# Patient Record
Sex: FEMALE | Race: WHITE | NOT HISPANIC OR LATINO | URBAN - METROPOLITAN AREA
[De-identification: names, ages, dates, MRNs, and addresses within clinical notes are randomized per-mention and may not be internally consistent; named-entity substitution may affect disease eponyms.]

---

## 2019-07-05 ENCOUNTER — HISTORICAL (OUTPATIENT)
Dept: ADMINISTRATIVE | Facility: HOSPITAL | Age: 84
End: 2019-07-05

## 2019-07-26 ENCOUNTER — HISTORICAL (OUTPATIENT)
Dept: LAB | Facility: HOSPITAL | Age: 84
End: 2019-07-26

## 2019-07-29 ENCOUNTER — HISTORICAL (OUTPATIENT)
Dept: LAB | Facility: HOSPITAL | Age: 84
End: 2019-07-29

## 2019-07-31 ENCOUNTER — HISTORICAL (OUTPATIENT)
Dept: ADMINISTRATIVE | Facility: HOSPITAL | Age: 84
End: 2019-07-31

## 2019-07-31 LAB — FINAL CULTURE: NORMAL

## 2022-04-29 VITALS
HEIGHT: 67 IN | SYSTOLIC BLOOD PRESSURE: 108 MMHG | DIASTOLIC BLOOD PRESSURE: 66 MMHG | WEIGHT: 166.25 LBS | BODY MASS INDEX: 26.09 KG/M2

## 2022-04-29 NOTE — DISCHARGE SUMMARY
Patient:   Sherwin Jenkins            MRN: [id number]            FIN: [id number]               Age:   86 years     Sex:  Female     :  1932   Associated Diagnoses:   None   Author:   Roshni Wooten      Date of Service: 2019      Discharge Information      Discharge Summary Information   Date of Admission: 2019  Date of Discharge: 2019  Admit Diagnosis:  Deconditioning/debility secondary to left hip fracture         Leukocytosis         Dementia         Anemia         GERD  Discharge Diagnosis:  Deconditioning/debility secondary to left hip fracture (improved)          Dementia (stable)          Anemia (stable)          GERD (stable)          PCM/anorexia (stable)          Dysuria (improved)  Internal Medicine (attending):  Avila Irizarry MD  Physiatry (consulting):  Ilir De La Fuente MD     OUTPATIENT PROVIDERS  PCP:  Hood Cornell MD (in [place], will need referral to PCP in [place])  Orthopedics:  Chandler Vazquez MD       Hospital Course   86-year-old WF presented to [facility] ED on 2019 complaining of left hip pain after falling in bathroom at daughter's home.  Of note, at the time of the fall she was being treated for UTI with Macrobid.  PMH significant for dementia/mild cognitive impairment.  Work-up significant for left displaced comminuted proximal femoral fracture.  Tolerated left intertrochanteric IM nailing without perioperative complications.  Hospital course uneventful.  Mild anemia postoperatively (H&H from 10.2-8.4 and 33.6-26.8).  Recommendations from orthopedics to utilize Lovenox 30 mg twice a day for 2 weeks followed by aspirin twice a day for 4 weeks.  Tolerated transfer to [facility]  inpatient rehab unit on  without incident.    During inpatient rehab course, patient remained continent of bowel and bladder.  Has nocturnal incontinence intermittently.  Therapy scores as follows: RT: Overall minimal to standby assist.  Limitations include pain control and cognitive  impairment.  Requires rest breaks.  PT: Overall standby assist.  Bed mobility improved.  Gait distance limited by fatigue at times.  Needs both railings for stairs.  OT: Overall minimal assist.  Anxious at times.  Appetite and bowel regimen remains at goal.  Medication reconciliation completed.  Vital signs are stable.  Recent lab work unremarkable.  Stable for discharge home with home health.  To follow-up with scheduled appointments reported below.    Chief Complaint:  Debility 2/2 left displaced comminuted proximal femoral fracture s/p left intertrochanteric IM nailing on 6/28/2019        Physical Examination      Vital Signs (last 24 hrs)_____  Last Charted___________  Temp Oral      36.6 DegC  (JUL 22 06:08)  Heart Rate Peripheral   78 bpm  (JUL 22 06:08)  Resp Rate          20 br/min  (JUL 21 14:44)  SBP      109 mmHg  (JUL 22 06:08)  DBP      70 mmHg  (JUL 22 06:08)  SpO2      98 %  (JUL 22 06:08)     General:  Alert and oriented, No acute distress.    Eye:  Pupils are equal, round and reactive to light, Vision unchanged.    HENT:  Normocephalic.    Neck:  Supple, Non-tender.    Respiratory:  Lungs are clear to auscultation, Respirations are non-labored, Breath sounds are equal, Symmetrical chest wall expansion, No chest wall tenderness.    Cardiovascular:  Normal rate, Regular rhythm, No murmur, Good pulses equal in all extremities, Normal peripheral perfusion, No edema.    Gastrointestinal:  Soft, Non-tender, Normal bowel sounds.    Musculoskeletal:  Normal range of motion, No tenderness, No swelling, No deformity, clean/dry dressing to left hip.    Integumentary:  Warm, Intact, Moist, No pallor, No rash.    Neurologic:  Alert, Oriented, Normal sensory, Normal motor function, No focal deficits, Cranial Nerves II-XII are grossly intact.    Cognition and Speech:  Speech clear and coherent, Oriented to self.    Psychiatric:  Cooperative, Appropriate mood & affect, Non-suicidal.        Results Review   General  results   Today's results   7/22/2019 5:50 CDT       WBC                       5.2 x10(3)/mcL                             RBC                       3.67 x10(6)/mcL  LOW                             Hgb                       10.8 gm/dL  LOW                             Hct                       34.7 %  LOW                             Platelet                  308 x10(3)/mcL                             MCV                       94.6 fL  HI                             MCH                       29.4 pg                             MCHC                      31.1 gm/dL  LOW                             RDW                       14.5 %                             MPV                       9.2 fL                             Abs Neut                  2.02 x10(3)/mcL  LOW                             Neutro Auto               39.0 %  LOW                             Lymph Auto                48.3 %  HI                             Mono Auto                 10.5 %                             Eos Auto                  1.4 %                             Abs Eos                   0.07 x10(3)/mcL                             Basophil Auto             0.6 %                             Abs Neutro                2.02 x10(3)/mcL  LOW                             Abs Lymph                 2.49 x10(3)/mcL                             Abs Mono                  0.54 x10(3)/mcL                             Abs Baso                  0.03 x10(3)/mcL                             NRBC%                     0.0 %                             IG%                       0.200 %                             IG#                       0.0100                             Sodium Lvl                142 mmol/L                             Potassium Lvl             4.2 mmol/L                             Chloride                  110 mmol/L  HI                             CO2                       26.0 mmol/L                             Calcium Lvl               8.8 mg/dL                              Glucose Lvl               88 mg/dL                             BUN                       18.0 mg/dL                             Creatinine                0.89 mg/dL                             eGFR-AA                   >60 mL/min/1.73 m2  NA                             eGFR-FAISAL                  >60 mL/min/1.73 m2  NA                             Bili Total                0.4 mg/dL                             Bili Direct               0.10 mg/dL                             Bili Indirect             0.30 mg/dL                             AST                       15 unit/L                             ALT                       20 unit/L                             Alk Phos                  125 unit/L  HI                             Total Protein             6.5 gm/dL                             Albumin Lvl               2.9 gm/dL  LOW                             Globulin                  4 gm/dL                             A/G Ratio                 0.7 ratio  LOW                             Prealbumin                14.3 mg/dL  LOW     Most recent results      Discharge Plan   Discharge Summary Plan   Discharge Status: improved.        Location: Discharge to home home health     Medications: See discharge medicine reconciliation     Activity:  As tolerated     Diet:  Regular     Instructions:  Take all medications as prescribed.          Attend appointments as scheduled.          Return to ED if symptoms worsen, or if t > 100.4.     Education:  Left hip fracture.  Anorexia.  Anemia.       Follow-up: Chandler Vazquez MD on 7/31/2019 at 10 AM         [facility] care within 3 to 5 days      Discussed plan of care, and patient communicated understanding. Agreed to comply with recommendations.    Discharge Time: 46 minutes

## 2022-04-29 NOTE — H&P
Patient:   Sherwin Jenkins            MRN: [id number]            FIN: [id number]               Age:   86 years     Sex:  Female     :  1932   Associated Diagnoses:   None   Author:   Roshni Wooten      Date of Service: 2019       Chief Complaint   Debility 2/2 left displaced comminuted proximal femoral fracture s/p left intertrochanteric IM nailing on 2019       History of Present Illness   86-year-old WF presented to [facility] ED on 2019 complaining of left hip pain after falling in bathroom at daughter's home.  Of note, at the time of the fall she was being treated for UTI with Macrobid.  PMH significant for dementia/mild cognitive impairment.  Work-up significant for left displaced comminuted proximal femoral fracture.  Tolerated left intertrochanteric IM nailing without perioperative complications.  Hospital course uneventful.  Mild anemia postoperatively (H&H from 10.2-8.4 and 33.6-26.8).  Recommendations from orthopedics to utilize Lovenox 30 mg twice a day for 2 weeks followed by aspirin twice a day for 4 weeks.  Tolerated transfer to [facility]  inpatient rehab unit on  without incident.    Today sitting upright in chair in NAD.  Reports good appetite and sleep pattern.  LBM 6/30 after administration of miralax, colace and dulcolax per staff nurse.  Normally takes MOM nightly per daughter.  Vital signs at goal.  Recent labs and imaging reported below.        Histories   Past Medical History: Mild cognitive impairment/dementia, Left displaced comminuted proximal femoral fracture, Anemia   Procedure history: Left intertrochanteric IM nailing, Right TKA   Family History: Mother -  - No significant history, Father -  - No significant medical history   Social History     (-) TOB.     (+) ETOH occasional use.     (-) illicit drug use.     Completed some college.  Retired but previously worked in human resources.  Denies history of  involvement.  .  If she  returns to home in [facility] her main support will be neighbor.  He is a 92-year-old man who can check on her and may be able to provide transportation..     Prior level of functioning: Independent ADLs, drives, cooks, does chores, does laundry, grocery shops, manages finances, manages own meds, hires someone prolonged maintenance; does not have medical alert.  Enjoys playing golf several times a week.  Residence: Lived alone in [place]  [place] [facility].  Home is a single-story home with 1 step to gain entry.  No railings present.  Walk in shower with a built-in shower seat.  Threshold to gain entry, one grab bar installed and a hand-held shower.  Does not have an elevated toilet.  Currently living with her daughter in [place].   DME: One grab bar and shower, hand-held shower and rolling walker.  Patient will use [facility] for all future DME needs.      Review of Systems   Complete 12-point review of systems negative except for HPI      Health Status   Allergies:    Allergic Reactions (Selected)  No Known Allergies   Current medications:  (Selected)   Documented Medications  Documented  Percocet 5/325: 1 tab(s), Oral, q4hr, PRN PRN for pain, # 60 tab(s), 0 Refill(s)      Physical Examination      Vital Signs (last 24 hrs)_____  Last Charted___________  Temp Oral      36.4 DegC  (JUL 02 14:00)  Heart Rate Peripheral   90 bpm  (JUL 02 14:00)  Resp Rate          18 br/min  (JUL 02 14:00)  SBP      131 mmHg  (JUL 02 14:00)  DBP      68 mmHg  (JUL 02 14:00)  SpO2      98 %  (JUL 02 14:00)  Weight      74.8 kg  (JUL 02 14:00)     General:  Alert and oriented, No acute distress.    Eye:  Extraocular movements are intact, Normal conjunctiva.    HENT:  Normocephalic, Oral mucosa is moist.    Neck:  Supple, Non-tender, No jugular venous distention.    Respiratory:  Lungs are clear to auscultation, Respirations are non-labored, Breath sounds are equal.    Cardiovascular:  Normal rate,  Regular rhythm, No murmur.    Gastrointestinal:  Soft, Non-tender, Non-distended, Normal bowel sounds.    Musculoskeletal:  No swelling, No deformity, Clean/dry dressing to left hip.  No breakthrough bleeding/drainage. .    Integumentary:  Warm, Dry.    Neurologic:  Alert, No focal deficits, mild confusion.  , 2+ pedal pulses bilat, sensation intact to distal BLE.    Cognition and Speech:  Speech clear and coherent.    Psychiatric:  Cooperative, Appropriate mood & affect, Non-suicidal.       Review / Management   Laboratory Results   Today's Lab Results : PowerNote Discrete Results   7/2/2019 4:27 CDT        WBC                       13.0 x10(3)/mcL  HI                             RBC                       2.87 x10(6)/mcL  LOW                             Hgb                       8.6 gm/dL  LOW                             Hct                       26.6 %  LOW                             Platelet                  276 x10(3)/mcL                             MCV                       92.7 fL                             MCH                       30.0 pg                             MCHC                      32.3 gm/dL  LOW                             RDW                       13.4 %                             MPV                       10.2 fL                             Abs Neut                  7.61 x10(3)/mcL                             Neutro Auto               59 %  NA                             Lymph Auto                28 %  NA                             Mono Auto                 9 %  NA                             Eos Auto                  1 %  NA                             Abs Eos                   0.1 x10(3)/mcL                             Basophil Auto             0 %  NA                             Abs Neutro                7.61 x10(3)/mcL                             Abs Lymph                 3.7 x10(3)/mcL                             Abs Mono                  1.2 x10(3)/mcL                             Abs  Baso                  0.0 x10(3)/mcL                             Sodium Lvl                141 mmol/L                             Potassium Lvl             4.8 mmol/L                             Chloride                  105 mmol/L                             CO2                       33.0 mmol/L  HI                             Calcium Lvl               8.1 mg/dL  LOW                             Magnesium Lvl             2.3 mg/dL                             Glucose Lvl               105 mg/dL                             BUN                       11.0 mg/dL                             Creatinine                0.68 mg/dL                             eGFR-AA                   >60 mL/min/1.73 m2  NA                             eGFR-FAISAL                  >60 mL/min/1.73 m2  NA                             Bili Total                0.4 mg/dL                             Bili Direct               0.10 mg/dL                             Bili Indirect             0.30 mg/dL                             AST                       43 unit/L  HI                             ALT                       21 unit/L                             Alk Phos                  74 unit/L                             Total Protein             5.1 gm/dL  LOW                             Albumin Lvl               2.50 gm/dL  LOW                             Globulin                  2.60 gm/dL                             A/G Ratio                 1.0 ratio  LOW        Chest x-ray results   Anterior/posterior, Lateral, Reported at  6/28/2019 07:52:00, Reviewed radiologist's report, Reveals no acute disease process   Radiology results   X-ray, Left hip , Reported at  6/28/2019 07:52:00, Reviewed radiologist's report, Interpretation:  Displaced and comminuted proximal femoral fracture with lesser  trochanteric involvement      Impression and Plan   85 yo WF admitted on 7/2/2019    Deconditioning/debility secondary to left hip fracture  - s/p left  intertrochanteric IM nailing on 6/28/2019  - consult physiatry for rehab and pain management  - PT/OT/RT/ST to evaluate and treat  - TTWB to LLE  - Continue    Percocet 5/325mg q4 hours as needed  Ascorbic acid 500mg at bedtime    Leukocytosis  -most likely reactive  - will continue to follow closely    Dementia  - stable  - continue   Donepezil 10 mg at night    Anemia  - asymptomatic/stable   - will closely monitor    GERD  - stable  - continue   Protonix 40mg daily    AB therapy:  Cefazolin (6/28-7/1)    VTE Prophylaxis: Lovenox 30mg SQ for 2 weeks (7/12), then ASA for 4 weeks (8/3)    POA: Em Ann (daughter)  Living will: No  Contacts:  Em Ann (daughter) [phone]      Susi Ann (daughter) [phone]    CODE STATUS: FULL CODE  Internal Medicine (attending): Avila Irizarry MD  Physiatry (consulting): Ilir De La Fuente MD    OUTPATIENT PROVIDERS  PCP:  Hood Cornell MD (in [place], will need referral to PCP in [place])  Orthopedics:  Chandler Vazquez MD    DISPOSITION: Condition stable.  Tolerated transfer.  Recent labs and imaging reviewed.  Rehab admission orders initiated.  Medication reconciliation completed.  Consult physiatry for rehab and pain management.  PT/OT/RT/ST to evaluate and treat.  Admission labs in the morning.  Vital signs at goal.  MD of acute changes.    Total time spent on this encounter including chart review and direct 1-on-1 patient interaction: 106 minutes   Over 50% of this time was spent in counseling and coordination of care

## 2022-05-02 NOTE — HISTORICAL [facility] CERNER
This is a historical note converted from Lelo. Formatting and pictures may have been removed.  Please reference Lelo for original formatting and attached multimedia. Chief Complaint  5 WEEK F/U IM NAILING LEFT FEMUR SX 6/28/19 GLOBAL 9/26/19 HERE TODAY FOR X-RAYS.  History of Present Illness  Here today for follow-up evaluation status post?intramedullary nailing of left intertrochanteric femur fracture. She has been staying with her daughter.?Orders were for her to be toe-touch weightbearing to left lower extremity however her daughter states that shes been going up and downstairs?at her apartment?with assistance.?Shes been walking on her toes?which appears to be a miscommunication between what?toe-touch weightbearing means versus?what she has been?shown to do with physical therapy. She reports no pain in her left hip. She does have pain and stiffness in her left knee which was pre-existing her fall. She has?left?knee osteoarthritis?and new that she?probably needed a total knee replacement on the left?prior to falling and breaking her hip.  Review of Systems  Otherwise negative  Physical Exam  Vitals & Measurements  T:?97.8? ?F (Oral)? BP:?108/66?  HT:?170?cm? WT:?75.4?kg? BMI:?26.09?  Left lower extremity:?Surgical incisions are all well-healed.?No pain with internal/external rotation left hip.?She has stiffness with attempted range of motion of her left knee.?No instability noted.?Moving her ankle and digits well. swelling or tenderness, no?signs of?DVT. Mild swelling noted?in the left leg compared to the right.  Assessment/Plan  1.?Closed intertrochanteric fracture of left femur?S72.142A  Ordered:  Clinic Follow up, *Est. 09/11/19 3:00:00 CDT, Order for future visit, Closed intertrochanteric fracture of left femur, [facility]  Post-Op follow-up visit [id number] PC, Closed intertrochanteric fracture of left femur, [facility], 07/31/19 12:43:00 CDT  XR Femur Left 2 Views, Routine, *Est. 09/11/19  3:00:00 CDT, Post-Op, None, Ambulatory, Rad Type, Order for future visit, Closed intertrochanteric fracture of left femur, Not Scheduled, *Est. 09/11/19 3:00:00 CDT  ?  ?  ?  We had an extensive discussion today regarding her?continued activity levels.?Walking on her toes offers her?no benefit?and does not protect her hip?since she?is still full weightbearing.?I will allow her to begin walking in the house?from her bed to the restroom.?For longer distances she needs to continue to use?a wheelchair.?She is still at risk for further collapse and possible cut out.?Well need to monitor her closely.?I like to see her back in 6 weeks for repeat x-rays left hip. She and her daughter understand?and agree with our plan today?and all questions concerns were addressed.  ?  Referrals  Clinic Follow up, *Est. 09/11/19 3:00:00 CDT, Order for future visit, Closed intertrochanteric fracture of left femur, [facility]  PT/OT External Referral, 07/31/19 10:53:00 CDT, Closed intertrochanteric fracture of left femur, Evaluate and Treat, 3 X Week, OK to stand on LLE for transfers, can WBAT LLE for short distances only (bathroom, bed, etc) other than that NWB LLE. ROM, strengthneing and stretchi...   Problem List/Past Medical History  Ongoing  Dementia  Historical  No qualifying data  Procedure/Surgical History  Gamma Nail Insertion (Left) (06/28/2019)  Reposition Left Upper Femur with Intramedullary Internal Fixation Device, Percutaneous Approach (06/28/2019)   Medications  Amoxil 500 mg Cap, 500 mg= 1 cap(s), Oral, TID  ascorbic acid 500 mg oral tablet, 500 mg= 1 tab(s), Oral, qPM,? ?Not taking: Last Dose Date/Time Unknown  Colace 100 mg oral capsule, 100 mg= 1 cap(s), Oral, BID  donepezil 10 mg oral tablet, 10 mg= 1 tab(s), Oral, qPM  donepezil 5 mg oral tablet, 10 mg= 2 tab(s), Oral, qPM  levoFLOXacin 500 mg oral tablet, 500 mg= 1 tab(s), Oral, Daily  nitrofurantoin macrocrystals-monohydrate 100 mg oral capsule, 100 mg= 1 cap(s),  Oral, BID  Percocet 5/325 oral tablet, 1 tab(s), Oral, q6hr, PRN  Protonix 40 mg ORAL enteric coated tablet, 40 mg= 1 tab(s), Oral, Daily,? ?Not taking: Last Dose Date/Time Unknown  Robaxin 500 mg oral tablet, 500 mg= 1 tab(s), Oral, TID, PRN  Zofran 4 mg oral tablet, 4 mg= 1 tab(s), Oral, TID, PRN,? ?Not taking: Last Dose Date/Time Unknown  Allergies  No Known Allergies  Social History  Abuse/Neglect  No, 07/02/2019  Tobacco  Never (less than 100 in lifetime), N/A, 07/02/2019  Health Maintenance  Health Maintenance  ???Pending?(in the next year)  ??? ??OverDue  ??? ? ? ?Advance Directive due??01/01/19??and every 1??year(s)  ??? ? ? ?Cognitive Screening due??01/01/19??and every 1??year(s)  ??? ? ? ?Functional Assessment due??01/01/19??and every 1??year(s)  ??? ? ? ?Geriatric Depression Screening due??01/01/19??and every 1??year(s)  ??? ??Due?  ??? ? ? ?ADL Screening due??07/31/19??and every 1??year(s)  ??? ? ? ?Bone Density Screening due??07/31/19??Variable frequency  ??? ? ? ?Pneumococcal Vaccine due??07/31/19??Variable frequency  ??? ? ? ?Pneumococcal Vaccine due??07/31/19??and every?  ??? ? ? ?Tetanus Vaccine due??07/31/19??and every 10??year(s)  ??? ? ? ?Zoster Vaccine due??07/31/19??and every 100??year(s)  ??? ??Due In Future?  ??? ? ? ?Fall Risk Assessment not due until??01/01/20??and every 1??year(s)  ??? ? ? ?Obesity Screening not due until??01/01/20??and every 1??year(s)  ???Satisfied?(in the past 1 year)  ??? ??Satisfied?  ??? ? ? ?Blood Pressure Screening on??07/31/19.??Satisfied by Sadi Spivey  ??? ? ? ?Body Mass Index Check on??07/31/19.??Satisfied by Sadi Spivey  ??? ? ? ?Diabetes Screening on??07/26/19.??Satisfied by Cyril WILDER, Natalie SAM  ??? ? ? ?Fall Risk Assessment on??07/22/19.??Satisfied by Tracey Joshua RN  ??? ? ? ?Obesity Screening on??07/31/19.??Satisfied by Sadi Spivey  ?  Diagnostic Results  Left femur 2 views:?Shes had some collapse?of the fracture site?compared to her postoperative  films which is not unexpected given her?reverse obliquity pattern. Hardware intact.

## 2022-05-02 NOTE — HISTORICAL [facility] CERNER
This is a historical note converted from [place]. Formatting and pictures may have been removed.  Please reference [employer] for original formatting and attached multimedia. Chief Complaint  Left Hip Fx/Dementia  Reason for Consultation  Physiatry  History of Present Illness  Admit MD: Avila Irizarry MD  Consult Physiatry: Ilir De La Fuente MD  HPI: 86-year-old WF presented to [facility] ED on 6/28/2019 complaining of left hip pain after falling in bathroom at daughters home. ?Of note, at the time of the fall she was being treated for UTI with Macrobid. ?PMH significant for dementia/mild cognitive impairment. ?Work-up significant for left displaced comminuted proximal femoral fracture. ?Tolerated left intertrochanteric IM nailing without perioperative complications. ?Hospital course uneventful. ?Mild anemia postoperatively (H&H from 10.2-8.4 and 33.6-26.8). ?Recommendations from orthopedics to utilize Lovenox 30 mg twice a day for 2 weeks followed by aspirin twice a day for 4 weeks. ?Tolerated transfer to [facility] ?inpatient rehab unit on 7/2 without incident.  7/3: Seen in patient room, seated in WC. Oriented to person and place as well as having a fall, but unsure of any injury. States she has some discomfort to left hip but not too bad. Seated in WC without cushion and continues to grimace in discomfort. Adding cushion. Reports good sleep, appetite, but bowels not moving. Does not remember last BM.?Reported 6/30. Tells me she fell at home and lives alone in [place] since her husbands passing. A neighbor that built his house at the same time also lost his wife and they check in on each other. He has a daughter in [place] and she thought hed come visit his daughter and then come see her here. She repeated this to me three times. But then also tells me she fell at her daughters house in the upstairs bathroom in [place]. Tells me she was driving, grocery shopping, cooking and keeping her house up on her own.  Has a neighbor take care of the lawn. Her goal is to get back home and start golfing again. VSSAF. Nursing came with concerns of pain once PT had started evaluation. Pt with labored breathing with decline in cognition. Placed 02 via NC and cued her with deep breathing. Administered pain medication. BP good. 02 sat staying at 100%. Assisted back in bed and patient began to come back to baseline and respiration rate decreased. Denies history of anxiety or feeling nervous. Discussed with IM. ?  Review of Systems  Comprehensive Review of Systems performed with no exceptions other than as noted in HPI.  Barriers to Discharge:  Social:?Patient completed some college. 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 Denies history of  involvement.?Patient is . ?If patient returns to her home in [place], [place]. ?Her main support system will be her neighbor. ?He is a 92 year old man who can check on her and MAY be able to provide transportation. ?I did not speak with daughters about what support they can provide.  Medical:  Functional:Prior Level of Fx: ?independent ADLs, drives, cooks, does chores, does laundry, grocery shops, manages finances, manages own meds, hires someone for lawn maintenance; does not have medic alert *Enjoys playing golf several times a week  Residence: ?Patient lives alone in [place]. ?[place] is located between [place] and [place]. ?Her home is a single story home with 1 step to gain entry- no rails present. ?Patient uses a walk in shower with a built in shower seat. There is a threshold to gain entry, one grab bar installed, and a hand held shower. ?She does not have elevated toilet.  DME: ? Patient has the following DME: one grab bar in shower, hand held shower, and rolling walker (husbands).  Psychiatric:?Hx mental health/substance abuse: ?Denies history of mental health problems / Denies smoking- occasionally drinks ETOH  Insurance: ?[place] Medicare Managed  Diet: ? Regular  Diet  Education//Work Hx:  Family: ? 1) Em Ann (lives in [place]- [phone]) 2) Susi Ann (daughter- lives in [place] [phone]) ?*other two children live in [place] and [place]. None are located near patients home in [place]  Power of  (yes- Em Ann) / Living Will (no)  ?  Physical Exam  Vitals & Measurements  T:?36.4? ?C (Oral)? HR:?90(Peripheral)? RR:?18? BP:?131/68? SpO2:?98%? WT:?74.8?kg?  General:?well-developed, well-nourished, in no acute distress, after PT started-panic  Eye: EOMI, clear conjunctiva, eyelids normal  HENT:?normocephalic, ?oropharynx and nasal mucosal surfaces moist  Neck: full range of motion, supple  Respiratory:?clear to auscultation bilaterally  Cardiovascular:?regular rate and rhythm without murmurs, gallops or rubs  Gastrointestinal:?soft, non-tender, non-distended with normal bowel sounds, without masses to palpation  Musculoskeletal: decreased ROM/strength to LLE  Integumentary: no rashes or skin lesions present, L hip-incisions-staples, island-c/d/i  Neurologic: alert and oriented to person and place, aware of fall, confusion increased with panic triggered by pain with therapy  ?  Assessment/Plan  1.?Debility?R53.81  PT/OT/RT/ST to evaluate and treat  2.?Hip fracture, left?S72.002A  s/p left intertrochanteric IM nailing on 6/28/2019?  TTWB to LLE  acetaminophen-oxyCODONE (Percocet 5/325)?q4hr PRN for pain  ascorbic acid 500 mg oral tablet)500 mg, form: Tab, Oral, qPM  3.?Dementia?F03.90  donepezil 10 mg, form: Tab, Oral, qPM  4.?GERD (gastroesophageal reflux disease)?K21.9  pantoprazole (Protonix) 40 mg, form: Tab-EC, Oral, Daily  VTE Prophylaxis: Lovenox 30mg SQ for 2 weeks (7/12), then ASA for 4 weeks (8/3) CODE STATUS: FULL CODE   Problem List/Past Medical History  Ongoing  Dementia  Historical  No qualifying data  Procedure/Surgical History  Gamma Nail Insertion (Left) (06/28/2019)   Medications  Inpatient  acetaminophen 325 mg  oral tablet, 325 mg= 1 tab(s), Oral, q4hr, PRN  acetaminophen 325 mg oral tablet, 650 mg= 2 tab(s), Oral, q6hr, PRN  ascorbic acid 500 mg oral tablet, 500 mg= 1 tab(s), Oral, qPM  Colace 100 mg oral capsule, 100 mg= 1 cap(s), Oral, BID  donepezil, 10 mg= 2 tab(s), Oral, qPM  Dulcolax Laxative 10 mg RECTAL suppository, 10 mg= 1 supp, MD (rectal), q3day, PRN  hydrALAZINE (Apresoline) Inj., 10 mg= 0.5 mL, IV Push, q2hr, PRN  lactulose 10 g/15 mL oral syrup, 20 gm= 30 mL, Oral, Every other day, PRN  Lovenox, 30 mg= 0.3 mL, Subcutaneous, q12hr  nitroglycerin 0.4 mg sublingual TAB, 0.4 mg= 1 tab(s), SL, q5min, PRN  Percocet 5/325, 1 tab(s), Oral, q4hr, PRN  Protonix, 40 mg= 1 tab(s), Oral, Daily  Zofran 4 mg oral tablet, 4 mg= 1 tab(s), Oral, TID, PRN  Home  donepezil 10 mg oral tablet, 10 mg= 1 tab(s), Oral, qPM  Percocet 5/325, 1 tab(s), Oral, q4hr, PRN  Allergies  No Known Allergies  Social History  Abuse/Neglect  No, 07/02/2019  Tobacco  Never (less than 100 in lifetime), N/A, 07/02/2019  Lab Results  Test Name Test Result Date/Time   Sodium Lvl 140 mmol/L 07/03/2019 06:00 CDT   Potassium Lvl 4.1 mmol/L 07/03/2019 06:00 CDT   Chloride 106 mmol/L 07/03/2019 06:00 CDT   CO2 29.0 mmol/L 07/03/2019 06:00 CDT   Calcium Lvl 8.5 mg/dL 07/03/2019 06:00 CDT   Glucose Lvl 98 mg/dL 07/03/2019 06:00 CDT   BUN 16.0 mg/dL 07/03/2019 06:00 CDT   Creatinine 0.77 mg/dL 07/03/2019 06:00 CDT   eGFR-AA >60 mL/min/1.73 m2 07/03/2019 06:00 CDT   eGFR-FAISAL >60 mL/min/1.73 m2 07/03/2019 06:00 CDT   Bili Total 0.6 mg/dL 07/03/2019 06:00 CDT   Bili Direct <0.10 mg/dL 07/03/2019 06:00 CDT   Bili Indirect >0.50 mg/dL 07/03/2019 06:00 CDT   AST 40 unit/L (High) 07/03/2019 06:00 CDT   ALT 27 unit/L 07/03/2019 06:00 CDT   Alk Phos 84 unit/L 07/03/2019 06:00 CDT   Total Protein 6.2 gm/dL (Low) 07/03/2019 06:00 CDT   Albumin Lvl 2.5 gm/dL (Low) 07/03/2019 06:00 CDT   Globulin 4 gm/dL 07/03/2019 06:00 CDT   A/G Ratio 0.6 ratio (Low) 07/03/2019 06:00  CDT   Prealbumin 12.8 mg/dL (Low) 07/03/2019 06:00 CDT   WBC 11.2 x10(3)/[facility] 07/03/2019 06:00 CDT   RBC 2.99 x10(6)/mcL (Low) 07/03/2019 06:00 CDT   Hgb 9.0 gm/dL (Low) 07/03/2019 06:00 CDT   Hct 27.6 % (Low) 07/03/2019 06:00 CDT   Platelet 312 x10(3)/[facility] 07/03/2019 06:00 CDT   MCV 92.3 fL 07/03/2019 06:00 CDT   MCH 30.1 pg 07/03/2019 06:00 CDT   MCHC 32.6 gm/dL (Low) 07/03/2019 06:00 CDT   RDW 13.7 % 07/03/2019 06:00 CDT   MPV 9.6 fL 07/03/2019 06:00 CDT   Abs Neut 5.66 x10(3)/[facility] 07/03/2019 06:00 CDT   Neutro Auto 50.7 % 07/03/2019 06:00 CDT   Lymph Auto 36.1 % 07/03/2019 06:00 CDT   Mono Auto 8.9 % 07/03/2019 06:00 CDT   Eos Auto 1.3 % 07/03/2019 06:00 CDT   Abs Eos 0.15 x10(3)/[facility] 07/03/2019 06:00 CDT   Basophil Auto 0.4 % 07/03/2019 06:00 CDT   Abs Neutro 5.66 x10(3)/[facility] 07/03/2019 06:00 CDT   Abs Lymph 4.04 x10(3)/[facility] 07/03/2019 06:00 CDT   Abs Mono 0.99 x10(3)/[facility] 07/03/2019 06:00 CDT   Abs Baso 0.05 x10(3)/[facility] 07/03/2019 06:00 CDT   NRBC% 0.0 % 07/03/2019 06:00 CDT   IG% 2.600 % (High) 07/03/2019 06:00 CDT   IG# 0.2900 (High) 07/03/2019 06:00 CDT   Diagnostic Results  (06/28/2019 13:23 CDT XR Femur Left 2 Views)  Reason For Exam  Post-Op  ?  Radiology Report  Left femur 2 views.  ?  HISTORY: Postop.  ?  FINDINGS: Examination reveals the presence of a gamma rate in  intramedullary jeoy fixating a fracture of the proximal femur position  and alignment of the visualized osseous structures is close to  anatomical no other acute fractures or dislocations identified.  ?  IMPRESSION: Internal fixation  ?  ?  Signature Line  Electronically Signed By: Tomer Arenas MD  Date/Time Signed: 06/28/2019 13:28 [1]     [1] XR Femur Left 2 Views; Tomer Arenas MD 06/28/2019 13:23 CDT   dos 7/3/19  I have evaluated the patient  I have reviewed old records  I have completed the prescreening process  I have reviewed?medical reconciliation and admit orders  I have discussed the case with nurse practitioners and staff  I  have?reviewed?the?BELLA  I am in agreement with the initial plan of care  ?